# Patient Record
Sex: FEMALE | Race: WHITE | NOT HISPANIC OR LATINO
[De-identification: names, ages, dates, MRNs, and addresses within clinical notes are randomized per-mention and may not be internally consistent; named-entity substitution may affect disease eponyms.]

---
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
## 2021-04-03 ENCOUNTER — TRANSCRIPTION ENCOUNTER (OUTPATIENT)
Age: 21
End: 2021-04-03

## 2023-08-09 PROBLEM — Z00.00 ENCOUNTER FOR PREVENTIVE HEALTH EXAMINATION: Status: ACTIVE | Noted: 2023-08-09

## 2023-08-30 ENCOUNTER — NON-APPOINTMENT (OUTPATIENT)
Age: 23
End: 2023-08-30

## 2023-08-30 ENCOUNTER — APPOINTMENT (OUTPATIENT)
Dept: ENDOCRINOLOGY | Facility: CLINIC | Age: 23
End: 2023-08-30
Payer: COMMERCIAL

## 2023-08-30 VITALS
HEART RATE: 85 BPM | WEIGHT: 130 LBS | OXYGEN SATURATION: 98 % | BODY MASS INDEX: 24.55 KG/M2 | SYSTOLIC BLOOD PRESSURE: 120 MMHG | DIASTOLIC BLOOD PRESSURE: 60 MMHG | RESPIRATION RATE: 17 BRPM | HEIGHT: 61 IN | TEMPERATURE: 98.4 F

## 2023-08-30 DIAGNOSIS — Z78.9 OTHER SPECIFIED HEALTH STATUS: ICD-10-CM

## 2023-08-30 PROCEDURE — 99205 OFFICE O/P NEW HI 60 MIN: CPT

## 2023-08-30 NOTE — PHYSICAL EXAM
[de-identified] : General: No distress, well nourished Eyes: Normal Sclera, EOMI, PERRL ENT: Normal appearance of the nose, normal oropharynx Neck/Thyroid: No cervical lymphadenopathy, thyroid gland 60 g in size, no thyroid nodules, non-tender Respiratory: No use of accessory muscles of respiration, vesicular breath sounds heard bilaterally, no crepitations or ronchi Cardiovascular: S1 and S2 heard and normal, no S3 or S4, no murmurs, radial pulse normal bilaterally Abdomen: soft, non-tender, no masses, normal bowel sounds Musculoskeletal: No swelling or deformities of joints of hands, no pedal edema Neurological: Normal range of motion in the hands, Normal brachioradialis reflexes bilaterally Psychiatry: Patient converses normally, good judgement and insight Skin: No rashes in hands, no nodules palpated in hands

## 2023-08-30 NOTE — HISTORY OF PRESENT ILLNESS
[FreeTextEntry1] : Problems: 1. Hyperthyroidism due to Graves' disease 2. Thyroid goiter 3. Graves' orbitopathy 4. Thyroid goiter  Hyperthyroidism due to Graves' disease/Thyroid goiter/Graves' orbitopathy/Thyroid goiter 1. Diagnosed in April 2023 with severe hyperthyroidism due to Graves' disease 2. Labs: April 2023 - TRAB 21.96 (</+2),  (<140), TPO antibodies 9 (<9), antithyroglobulin antibodies 11 (</=1), TSH < 0.015 (0.47 to 4.7), Free T4 - 5.64 (0.71 to 1.85), Free T3 > 22.8 (2.77 to 5.27) June 2023 - 06/06/2023 - Cr N, AST 37 (14 to 36), ALT 38 (<35), WBC N, Hb N, plt N 07/25/2023 - WBC N, Hb N, plt 288 N, Cr N, AST N, ALT N 07/31/2023 - TSH < 0.015 (low), Free T3 - 17.9 (2.77 to 5.27), Free T4 - 1.71 N 3. Radiology: 02/16/2023 - US thyroid - thyroid gland normal in size, no nodules seen 4. Father and sister have hypothyroidism, no family history of thyroid cancer, no personal history of radiation treatment 5. Has Graves' orbitopathy and follows up with Ophthalmology, no Graves' dermopathy 6. Meds: Patient is on oral hormonal contraceptive pills Patient used methimazole in the past - had puritis and vomiting with methimazole 10 mg po bid - this was discontinued and reintroduced at a lower dose but vomiting recurred.  PTU 50 mg po four times per day - patient started this dose in June 2023.  Patient was on propranolol but she stopped using this as she says she felt like she no longer needed it.

## 2023-08-30 NOTE — ASSESSMENT
[FreeTextEntry1] : This patient was diagnosed with severe hyperthyroidism due to Graves' disease in April 2023 (TSH-R abs were positive). She also has Graves' orbitopathy and follows up with Ophthalmology.  The patient is on PTU as she had pruritis and vomiting even with small doses of methimazole.  Labs from 07/31/2023 revealed she was still hyperthyroid on PTU 50 mg po four times daily so I increased the dose to  mg po tid on 08/30/2023.   Patient used propranolol in the past but she no longer wants to use this.  The patient wishes to avoid radioactive iodine therapy or surgery if she can - in addition, radioactive iodine therapy can worsen her Graves' orbitopathy.   She has a thyroid goiter that was not present in US from Feb 2023 so I ordered a repeat thyroid US.  Patient advised that PTU may cause agranulocytosis and hepatic injury - the patient is to stop PTU and contact me urgently or go to an Urgent care facility if the patient develops fever, sore throat or jaundice.    Plan: 1. Increase PTU to 100 mg po tid 2. Thyroid US  3. Labs to be done in 2 weeks - see below 4. Follow up in 2 weeks to review results - telehealth visit ok

## 2023-09-06 ENCOUNTER — APPOINTMENT (OUTPATIENT)
Dept: ULTRASOUND IMAGING | Facility: IMAGING CENTER | Age: 23
End: 2023-09-06

## 2023-09-11 ENCOUNTER — OUTPATIENT (OUTPATIENT)
Dept: OUTPATIENT SERVICES | Facility: HOSPITAL | Age: 23
LOS: 1 days | End: 2023-09-11
Payer: COMMERCIAL

## 2023-09-11 ENCOUNTER — APPOINTMENT (OUTPATIENT)
Dept: ULTRASOUND IMAGING | Facility: CLINIC | Age: 23
End: 2023-09-11
Payer: COMMERCIAL

## 2023-09-11 DIAGNOSIS — E05.00 THYROTOXICOSIS WITH DIFFUSE GOITER WITHOUT THYROTOXIC CRISIS OR STORM: ICD-10-CM

## 2023-09-11 PROCEDURE — 76536 US EXAM OF HEAD AND NECK: CPT

## 2023-09-11 PROCEDURE — 76536 US EXAM OF HEAD AND NECK: CPT | Mod: 26

## 2023-09-13 LAB
HCT VFR BLD CALC: 41.7 %
HGB BLD-MCNC: 14.8 G/DL
MCHC RBC-ENTMCNC: 29.5 PG
MCHC RBC-ENTMCNC: 35.5 GM/DL
MCV RBC AUTO: 83.1 FL
PLATELET # BLD AUTO: 260 K/UL
RBC # BLD: 5.02 M/UL
RBC # FLD: 13.1 %
WBC # FLD AUTO: 6.4 K/UL

## 2023-09-14 ENCOUNTER — APPOINTMENT (OUTPATIENT)
Dept: ENDOCRINOLOGY | Facility: CLINIC | Age: 23
End: 2023-09-14
Payer: COMMERCIAL

## 2023-09-14 LAB
ALBUMIN SERPL ELPH-MCNC: 4.2 G/DL
ALP BLD-CCNC: 138 U/L
ALT SERPL-CCNC: 20 U/L
ANION GAP SERPL CALC-SCNC: 13 MMOL/L
AST SERPL-CCNC: 19 U/L
BILIRUB SERPL-MCNC: 0.5 MG/DL
BUN SERPL-MCNC: 13 MG/DL
CALCIUM SERPL-MCNC: 9 MG/DL
CHLORIDE SERPL-SCNC: 105 MMOL/L
CO2 SERPL-SCNC: 21 MMOL/L
CREAT SERPL-MCNC: 0.82 MG/DL
EGFR: 103 ML/MIN/1.73M2
GLUCOSE SERPL-MCNC: 89 MG/DL
POTASSIUM SERPL-SCNC: 4.3 MMOL/L
PROT SERPL-MCNC: 6.1 G/DL
SODIUM SERPL-SCNC: 139 MMOL/L
T3FREE SERPL-MCNC: 3.45 PG/ML
T4 FREE SERPL-MCNC: 0.2 NG/DL
TSH SERPL-ACNC: <0.01 UIU/ML

## 2023-09-14 PROCEDURE — 99215 OFFICE O/P EST HI 40 MIN: CPT | Mod: 95

## 2023-09-14 RX ORDER — PROPYLTHIOURACIL 50 MG/1
50 TABLET ORAL
Refills: 0 | Status: COMPLETED | COMMUNITY

## 2023-09-19 ENCOUNTER — APPOINTMENT (OUTPATIENT)
Dept: OPHTHALMOLOGY | Facility: CLINIC | Age: 23
End: 2023-09-19
Payer: COMMERCIAL

## 2023-09-19 ENCOUNTER — NON-APPOINTMENT (OUTPATIENT)
Age: 23
End: 2023-09-19

## 2023-09-19 ENCOUNTER — TRANSCRIPTION ENCOUNTER (OUTPATIENT)
Age: 23
End: 2023-09-19

## 2023-09-19 PROCEDURE — 92004 COMPRE OPH EXAM NEW PT 1/>: CPT

## 2023-10-18 ENCOUNTER — APPOINTMENT (OUTPATIENT)
Dept: ENDOCRINOLOGY | Facility: CLINIC | Age: 23
End: 2023-10-18

## 2023-10-26 LAB
ALBUMIN SERPL ELPH-MCNC: 4.2 G/DL
ALP BLD-CCNC: 128 U/L
ALT SERPL-CCNC: 17 U/L
ANION GAP SERPL CALC-SCNC: 11 MMOL/L
AST SERPL-CCNC: 17 U/L
BASOPHILS # BLD AUTO: 0.03 K/UL
BASOPHILS NFR BLD AUTO: 0.4 %
BILIRUB SERPL-MCNC: 0.7 MG/DL
BUN SERPL-MCNC: 14 MG/DL
CALCIUM SERPL-MCNC: 9.7 MG/DL
CHLORIDE SERPL-SCNC: 106 MMOL/L
CO2 SERPL-SCNC: 22 MMOL/L
CREAT SERPL-MCNC: 0.71 MG/DL
EGFR: 122 ML/MIN/1.73M2
EOSINOPHIL # BLD AUTO: 0.02 K/UL
EOSINOPHIL NFR BLD AUTO: 0.3 %
GLUCOSE SERPL-MCNC: 97 MG/DL
HCT VFR BLD CALC: 41.7 %
HGB BLD-MCNC: 15 G/DL
IMM GRANULOCYTES NFR BLD AUTO: 0 %
LYMPHOCYTES # BLD AUTO: 2.09 K/UL
LYMPHOCYTES NFR BLD AUTO: 30.1 %
MAN DIFF?: NORMAL
MCHC RBC-ENTMCNC: 29.9 PG
MCHC RBC-ENTMCNC: 36 GM/DL
MCV RBC AUTO: 83.1 FL
MONOCYTES # BLD AUTO: 0.9 K/UL
MONOCYTES NFR BLD AUTO: 13 %
NEUTROPHILS # BLD AUTO: 3.9 K/UL
NEUTROPHILS NFR BLD AUTO: 56.2 %
PLATELET # BLD AUTO: 236 K/UL
POTASSIUM SERPL-SCNC: 4.5 MMOL/L
PROT SERPL-MCNC: 6.4 G/DL
RBC # BLD: 5.02 M/UL
RBC # FLD: 13 %
SODIUM SERPL-SCNC: 139 MMOL/L
T3FREE SERPL-MCNC: 12.6 PG/ML
T4 FREE SERPL-MCNC: 1.3 NG/DL
TSH SERPL-ACNC: <0.01 UIU/ML
WBC # FLD AUTO: 6.94 K/UL

## 2023-11-03 ENCOUNTER — APPOINTMENT (OUTPATIENT)
Dept: ENDOCRINOLOGY | Facility: CLINIC | Age: 23
End: 2023-11-03
Payer: COMMERCIAL

## 2023-11-03 PROCEDURE — 99214 OFFICE O/P EST MOD 30 MIN: CPT | Mod: 95

## 2023-11-20 ENCOUNTER — NON-APPOINTMENT (OUTPATIENT)
Age: 23
End: 2023-11-20

## 2023-11-20 ENCOUNTER — APPOINTMENT (OUTPATIENT)
Dept: OPHTHALMOLOGY | Facility: CLINIC | Age: 23
End: 2023-11-20
Payer: COMMERCIAL

## 2023-11-20 PROCEDURE — 92285 EXTERNAL OCULAR PHOTOGRAPHY: CPT

## 2023-11-20 PROCEDURE — 99215 OFFICE O/P EST HI 40 MIN: CPT

## 2023-11-27 ENCOUNTER — APPOINTMENT (OUTPATIENT)
Dept: DERMATOLOGY | Facility: CLINIC | Age: 23
End: 2023-11-27
Payer: COMMERCIAL

## 2023-11-27 DIAGNOSIS — L72.0 EPIDERMAL CYST: ICD-10-CM

## 2023-11-27 DIAGNOSIS — L60.0 INGROWING NAIL: ICD-10-CM

## 2023-11-27 PROCEDURE — 99204 OFFICE O/P NEW MOD 45 MIN: CPT

## 2023-11-27 RX ORDER — TRIAMCINOLONE ACETONIDE 1 MG/G
0.1 OINTMENT TOPICAL
Qty: 1 | Refills: 1 | Status: ACTIVE | COMMUNITY
Start: 2023-11-27 | End: 1900-01-01

## 2023-11-30 ENCOUNTER — APPOINTMENT (OUTPATIENT)
Dept: CT IMAGING | Facility: HOSPITAL | Age: 23
End: 2023-11-30
Payer: COMMERCIAL

## 2023-11-30 ENCOUNTER — OUTPATIENT (OUTPATIENT)
Dept: OUTPATIENT SERVICES | Facility: HOSPITAL | Age: 23
LOS: 1 days | End: 2023-11-30
Payer: COMMERCIAL

## 2023-11-30 DIAGNOSIS — E05.00 THYROTOXICOSIS WITH DIFFUSE GOITER WITHOUT THYROTOXIC CRISIS OR STORM: ICD-10-CM

## 2023-11-30 PROCEDURE — 70481 CT ORBIT/EAR/FOSSA W/DYE: CPT | Mod: 26

## 2023-11-30 PROCEDURE — 70481 CT ORBIT/EAR/FOSSA W/DYE: CPT

## 2023-12-01 ENCOUNTER — APPOINTMENT (OUTPATIENT)
Dept: OPHTHALMOLOGY | Facility: CLINIC | Age: 23
End: 2023-12-01
Payer: COMMERCIAL

## 2023-12-01 ENCOUNTER — NON-APPOINTMENT (OUTPATIENT)
Age: 23
End: 2023-12-01

## 2023-12-01 PROCEDURE — 99214 OFFICE O/P EST MOD 30 MIN: CPT

## 2023-12-22 ENCOUNTER — NON-APPOINTMENT (OUTPATIENT)
Age: 23
End: 2023-12-22

## 2023-12-22 ENCOUNTER — APPOINTMENT (OUTPATIENT)
Dept: OPHTHALMOLOGY | Facility: CLINIC | Age: 23
End: 2023-12-22

## 2023-12-22 ENCOUNTER — APPOINTMENT (OUTPATIENT)
Dept: OPHTHALMOLOGY | Facility: CLINIC | Age: 23
End: 2023-12-22
Payer: COMMERCIAL

## 2023-12-22 PROCEDURE — 99214 OFFICE O/P EST MOD 30 MIN: CPT

## 2023-12-22 PROCEDURE — 92285 EXTERNAL OCULAR PHOTOGRAPHY: CPT

## 2024-01-08 LAB
ALBUMIN SERPL ELPH-MCNC: 4.4 G/DL
ALP BLD-CCNC: 97 U/L
ALT SERPL-CCNC: 20 U/L
ANION GAP SERPL CALC-SCNC: 11 MMOL/L
AST SERPL-CCNC: 15 U/L
BASOPHILS # BLD AUTO: 0.04 K/UL
BASOPHILS NFR BLD AUTO: 0.4 %
BILIRUB SERPL-MCNC: 0.3 MG/DL
BUN SERPL-MCNC: 15 MG/DL
CALCIUM SERPL-MCNC: 9.6 MG/DL
CHLORIDE SERPL-SCNC: 104 MMOL/L
CO2 SERPL-SCNC: 24 MMOL/L
CREAT SERPL-MCNC: 0.82 MG/DL
EGFR: 102 ML/MIN/1.73M2
EOSINOPHIL # BLD AUTO: 0.02 K/UL
EOSINOPHIL NFR BLD AUTO: 0.2 %
GLUCOSE SERPL-MCNC: 89 MG/DL
HCT VFR BLD CALC: 43.3 %
HGB BLD-MCNC: 15.6 G/DL
IMM GRANULOCYTES NFR BLD AUTO: 0.5 %
LYMPHOCYTES # BLD AUTO: 3.07 K/UL
LYMPHOCYTES NFR BLD AUTO: 27.4 %
MAN DIFF?: NORMAL
MCHC RBC-ENTMCNC: 30.5 PG
MCHC RBC-ENTMCNC: 36 GM/DL
MCV RBC AUTO: 84.7 FL
MONOCYTES # BLD AUTO: 0.76 K/UL
MONOCYTES NFR BLD AUTO: 6.8 %
NEUTROPHILS # BLD AUTO: 7.26 K/UL
NEUTROPHILS NFR BLD AUTO: 64.7 %
PLATELET # BLD AUTO: 326 K/UL
POTASSIUM SERPL-SCNC: 4.4 MMOL/L
PROT SERPL-MCNC: 6.7 G/DL
RBC # BLD: 5.11 M/UL
RBC # FLD: 13.1 %
SODIUM SERPL-SCNC: 140 MMOL/L
T3FREE SERPL-MCNC: 2.65 PG/ML
T4 FREE SERPL-MCNC: 0.9 NG/DL
TSH SERPL-ACNC: 0.08 UIU/ML
WBC # FLD AUTO: 11.21 K/UL

## 2024-01-09 ENCOUNTER — APPOINTMENT (OUTPATIENT)
Dept: ENDOCRINOLOGY | Facility: CLINIC | Age: 24
End: 2024-01-09
Payer: COMMERCIAL

## 2024-01-09 ENCOUNTER — NON-APPOINTMENT (OUTPATIENT)
Age: 24
End: 2024-01-09

## 2024-01-09 ENCOUNTER — APPOINTMENT (OUTPATIENT)
Dept: OPHTHALMOLOGY | Facility: CLINIC | Age: 24
End: 2024-01-09
Payer: COMMERCIAL

## 2024-01-09 VITALS
BODY MASS INDEX: 25.49 KG/M2 | TEMPERATURE: 98.4 F | DIASTOLIC BLOOD PRESSURE: 80 MMHG | HEIGHT: 61 IN | RESPIRATION RATE: 17 BRPM | SYSTOLIC BLOOD PRESSURE: 122 MMHG | WEIGHT: 135 LBS | OXYGEN SATURATION: 95 % | HEART RATE: 72 BPM

## 2024-01-09 PROCEDURE — 99215 OFFICE O/P EST HI 40 MIN: CPT

## 2024-01-09 PROCEDURE — 99214 OFFICE O/P EST MOD 30 MIN: CPT

## 2024-01-09 PROCEDURE — 92285 EXTERNAL OCULAR PHOTOGRAPHY: CPT

## 2024-01-09 NOTE — PHYSICAL EXAM
[de-identified] : General: No distress, well nourished Eyes: Normal Sclera, EOMI, PERRL ENT: Normal appearance of the nose, normal oropharynx Neck/Thyroid: No cervical lymphadenopathy, thyroid gland 20 g in size, no thyroid nodules, non-tender Respiratory: No use of accessory muscles of respiration, vesicular breath sounds heard bilaterally, no crepitations or ronchi Cardiovascular: S1 and S2 heard and normal, no S3 or S4, no murmurs, radial pulse normal bilaterally Abdomen: soft, non-tender, no masses, normal bowel sounds Musculoskeletal: No swelling or deformities of joints of hands, no pedal edema Neurological: Normal range of motion in the hands, Normal brachioradialis reflexes bilaterally Psychiatry: Patient converses normally, good judgement and insight Skin: No rashes in hands, no nodules palpated in hands

## 2024-01-09 NOTE — HISTORY OF PRESENT ILLNESS
[FreeTextEntry1] : Problems: 1. Hyperthyroidism due to Graves' disease 2. Thyroid goiter 3. Graves' orbitopathy  Hyperthyroidism due to Graves' disease/Thyroid goiter/Graves' orbitopathy/Thyroid goiter 1. Diagnosed in April 2023 with severe hyperthyroidism due to Graves' disease 2. Labs: April 2023 - TRAB 21.96 (</+2),  (<140), TPO antibodies 9 (<9), antithyroglobulin antibodies 11 (</=1), TSH < 0.015 (0.47 to 4.7), Free T4 - 5.64 (0.71 to 1.85), Free T3 > 22.8 (2.77 to 5.27) June 2023 - 06/06/2023 - Cr N, AST 37 (14 to 36), ALT 38 (<35), WBC N, Hb N, plt N 07/25/2023 - WBC N, Hb N, plt 288 N, Cr N, AST N, ALT N 07/31/2023 - TSH < 0.015 (low), Free T3 - 17.9 (2.77 to 5.27), Free T4 - 1.71 N 11/30/2023 - TSI 24.9 (0-0.55), TSH-R abs 33.7 (0-1.75) 3. Radiology: A. 02/16/2023 - US thyroid - thyroid gland normal in size, no nodules seen B. 09/11/2023 - US thyroid - Enlarged heterogeneous hyperemic thyroid gland without focal nodularity consistent with a goiter. Right Lobe: 6.8 cm x 2.7 cm x 2.8 cm. Left Lobe: 6.8 cm x 3.1 cm x 3.4 cm. 4. Father and sister have hypothyroidism, no family history of thyroid cancer, no personal history of radiation treatment 5. Has Graves' orbitopathy and follows up with Ophthalmology - PATIENT IS ON PREDNISONE TO TREAT THIS, no Graves' dermopathy 6. Meds: A. Patient is on oral hormonal contraceptive pills B. Patient used methimazole in the past - had puritis and vomiting with methimazole 10 mg po bid - this was discontinued and reintroduced at a lower dose but vomiting recurred.  C. Patient was then switched to PTU - she started PTU 50 mg po four times per day in June 2023 - labs indicate she was hyperthyroid on this dose but patient stated she missed her PTU for one week prior to labs being done.  D. Patient was on propranolol in past but she stopped using this as she says she felt like she no longer needed it.  E. Current treatment: 08/30/2023 to 09/14/2023 - patient used  mg po tid (dispensed as 50 mg pills) but was hypothyroid on this on 09/13/2023 (Free T4 was low at 0.2) so the dose was changed on 09/14/2023 09/14/2023 to 11/03/2023 - patient used  mg po bid (dispensed as 50 mg pills) but had overt hyperthyroidism on 10/25/2023 so the dose was changed on 11/03/2023 On 01/08/2024, TSH was suppressed and Free T4 and Total T3 were normal on  mg po am, 50 mg po lunch time and 100 mg po pm (dispensed as 50 mg pills) - patient started this dose on 11/03/2023

## 2024-01-09 NOTE — ASSESSMENT
[FreeTextEntry1] : This patient was diagnosed with severe hyperthyroidism due to Graves' disease in April 2023 (TSH-R abs were positive). She also has Graves' orbitopathy and follows up with Ophthalmology.  The patient is on PTU as she had pruritis and vomiting even with small doses of methimazole.  On 01/08/2024, TSH was suppressed and Free T4 and Free T3 were normal on  mg po am, 50 mg po lunch time and 100 mg po pm (dispensed as 50 mg pills) - patient started this dose on 11/03/2023. Normalization of the TSH lags behind normalization of the Free T4 and Free T3 so I did not adjust her dose of PTU.   Patient used propranolol in the past but she no longer wants to use this.  The patient wishes to avoid radioactive iodine therapy or surgery if she can - in addition, radioactive iodine therapy can worsen her Graves' orbitopathy. THE PATIENT IS ON PREDNISONE TO TREAT HER GRAVES' ORBITOPATHY  She has a thyroid goiter - thyroid US in Sept 2023 revealed no thyroid nodules.  Patient advised that PTU may cause agranulocytosis and hepatic injury - the patient is to stop PTU and contact me urgently or go to an Urgent care facility if the patient develops fever, sore throat or jaundice.  Plan: 1. Continue PTU to 100 mg po am, 50 mg po lunch time and 100 mg po pm (dispensed as 50 mg pills) 2. Labs to be done in 3 months - see below- patient is concerned about her Grave's orbitopathy worsening so I will also monitor TSI and TSH-R antibodies - her ophthalmologist also wishes that this be monitored.  3. Follow up in 3 months to review results - telehealth visit ok.

## 2024-01-10 ENCOUNTER — OUTPATIENT (OUTPATIENT)
Dept: OUTPATIENT SERVICES | Facility: HOSPITAL | Age: 24
LOS: 1 days | Discharge: HOME | End: 2024-01-10

## 2024-01-10 ENCOUNTER — NON-APPOINTMENT (OUTPATIENT)
Age: 24
End: 2024-01-10

## 2024-01-10 ENCOUNTER — APPOINTMENT (OUTPATIENT)
Dept: SPEECH THERAPY | Facility: CLINIC | Age: 24
End: 2024-01-10

## 2024-01-10 LAB — TSI ACT/NOR SER: 9.3 IU/L

## 2024-01-10 NOTE — PLAN
[FreeTextEntry2] : 1. Audiological monitoring as per MD or sooner if change in hearing is suspected.

## 2024-01-10 NOTE — PROCEDURE
[Normal Cochlear] : consistent with normal cochlear outer hair cell function  [226 Hz] : 226 Hz [Normal Eardrum Mobility] : consistent with normal eardrum mobility [Type A Tympanogram] : Type A Normal [] : Audiogram: [] : Complete Audiological Evaluation [Good] : good [Insert Ear Phones] : insert ear phones [FreeTextEntry2] : TEOAEs present 1.5-4 kHz, bilaterally.  [de-identified] : Hearing within normal limits 250-8000 Hz, bilaterally. Thresholds obtained at 0 dB HL 10,000-12,500 Hz, bilaterally. Excellent SRS scores, bilaterally.

## 2024-01-10 NOTE — HISTORY OF PRESENT ILLNESS
[FreeTextEntry1] : 23-year-old female referred for baseline audiological evaluation prior to beginning treatment with Tepezza for Graves' disease. Patient reports no difficulties hearing. Very infrequent occasions of high-pitched tinnitus noted. Patient denies otalgia, aural fullness, and family history of hearing loss.

## 2024-01-10 NOTE — ASSESSMENT
[FreeTextEntry1] : Reviewed results and recommendations with patient. Patient expressed understanding of all. Discussed signs of change in hearing. Patient indicated that she understood all.

## 2024-01-11 LAB — TSH RECEPTOR AB: 1.88 IU/L

## 2024-01-16 ENCOUNTER — APPOINTMENT (OUTPATIENT)
Dept: OPHTHALMOLOGY | Facility: CLINIC | Age: 24
End: 2024-01-16

## 2024-01-17 DIAGNOSIS — H93.299 OTHER ABNORMAL AUDITORY PERCEPTIONS, UNSPECIFIED EAR: ICD-10-CM

## 2024-01-17 DIAGNOSIS — H93.293 OTHER ABNORMAL AUDITORY PERCEPTIONS, BILATERAL: ICD-10-CM

## 2024-01-30 ENCOUNTER — NON-APPOINTMENT (OUTPATIENT)
Age: 24
End: 2024-01-30

## 2024-01-30 ENCOUNTER — APPOINTMENT (OUTPATIENT)
Dept: OPHTHALMOLOGY | Facility: CLINIC | Age: 24
End: 2024-01-30
Payer: COMMERCIAL

## 2024-01-30 PROCEDURE — 99214 OFFICE O/P EST MOD 30 MIN: CPT

## 2024-01-30 PROCEDURE — 92083 EXTENDED VISUAL FIELD XM: CPT

## 2024-01-30 PROCEDURE — 92285 EXTERNAL OCULAR PHOTOGRAPHY: CPT

## 2024-01-30 PROCEDURE — 92133 CPTRZD OPH DX IMG PST SGM ON: CPT

## 2024-02-07 ENCOUNTER — APPOINTMENT (OUTPATIENT)
Dept: ENDOCRINOLOGY | Facility: CLINIC | Age: 24
End: 2024-02-07

## 2024-02-27 ENCOUNTER — APPOINTMENT (OUTPATIENT)
Dept: OPHTHALMOLOGY | Facility: CLINIC | Age: 24
End: 2024-02-27
Payer: COMMERCIAL

## 2024-02-27 ENCOUNTER — NON-APPOINTMENT (OUTPATIENT)
Age: 24
End: 2024-02-27

## 2024-02-27 PROCEDURE — 92285 EXTERNAL OCULAR PHOTOGRAPHY: CPT

## 2024-02-27 PROCEDURE — 99213 OFFICE O/P EST LOW 20 MIN: CPT

## 2024-03-13 ENCOUNTER — APPOINTMENT (OUTPATIENT)
Dept: RHEUMATOLOGY | Facility: CLINIC | Age: 24
End: 2024-03-13
Payer: COMMERCIAL

## 2024-03-13 VITALS — HEART RATE: 60 BPM | SYSTOLIC BLOOD PRESSURE: 119 MMHG | OXYGEN SATURATION: 99 % | DIASTOLIC BLOOD PRESSURE: 73 MMHG

## 2024-03-13 VITALS
DIASTOLIC BLOOD PRESSURE: 80 MMHG | RESPIRATION RATE: 16 BRPM | HEART RATE: 66 BPM | SYSTOLIC BLOOD PRESSURE: 131 MMHG | TEMPERATURE: 98.1 F | OXYGEN SATURATION: 97 %

## 2024-03-13 PROCEDURE — 96413 CHEMO IV INFUSION 1 HR: CPT

## 2024-03-14 RX ADMIN — TEPROTUMUMAB 0 MG: 500 INJECTION, POWDER, LYOPHILIZED, FOR SOLUTION INTRAVENOUS at 00:00

## 2024-03-15 RX ORDER — TEPROTUMUMAB 500 MG/1
500 INJECTION, POWDER, LYOPHILIZED, FOR SOLUTION INTRAVENOUS
Qty: 0 | Refills: 0 | Status: COMPLETED | OUTPATIENT
Start: 2024-03-14

## 2024-03-19 NOTE — HISTORY OF PRESENT ILLNESS
[No] : No [Denies] : Denies [N/A] : N/A [Declined] : Declined [Informed consent documented in EHR.] : Informed consent documented in EHR. [Right upper extremity] : Right upper extremity [24g] : 24g [Start Time: ___] : Medication Start Time: [unfilled] [End Time: ___] : Medication End Time: [unfilled] [Medication Name: ___] : Medication Name: [unfilled] [IV discontinued. Intact. No signs or symptoms of IV complications noted. Time: ___] : IV discontinued. Intact. No signs or symptoms of IV complications noted. Time: [unfilled] [Total Amount Administered: ___] : Total Amount Administered: [unfilled] [Patient left unit in no acute distress] : Patient left unit in no acute distress [Patient  instructed to seek medical attention with signs and symptoms of adverse effects] : Patient  instructed to seek medical attention with signs and symptoms of adverse effects [Medications administered as ordered and tolerated well.] : Medications administered as ordered and tolerated well. [de-identified] : first dose [Drug wasted: _____] : Drug wasted: [unfilled] [de-identified] : Hearring test performed 1/10/2024 [de-identified] : Patient presents for scheduled Tepezza infusion. Patient with history of thyroid eye disease, present with exophthalmos and complaints to dryness to right eye. Patient otherwise denies any other s/s. Medication education reinforced with patient, patient agreeable to all discussed.

## 2024-03-29 LAB
ALBUMIN SERPL ELPH-MCNC: 4.7 G/DL
ALP BLD-CCNC: 63 U/L
ALT SERPL-CCNC: 21 U/L
ANION GAP SERPL CALC-SCNC: 13 MMOL/L
AST SERPL-CCNC: 22 U/L
BASOPHILS # BLD AUTO: 0.02 K/UL
BASOPHILS NFR BLD AUTO: 0.3 %
BILIRUB SERPL-MCNC: 0.4 MG/DL
BUN SERPL-MCNC: 15 MG/DL
CALCIUM SERPL-MCNC: 9.7 MG/DL
CHLORIDE SERPL-SCNC: 102 MMOL/L
CO2 SERPL-SCNC: 24 MMOL/L
CREAT SERPL-MCNC: 1.03 MG/DL
EGFR: 78 ML/MIN/1.73M2
EOSINOPHIL # BLD AUTO: 0.01 K/UL
EOSINOPHIL NFR BLD AUTO: 0.2 %
GLUCOSE SERPL-MCNC: 102 MG/DL
HCT VFR BLD CALC: 39.6 %
HGB BLD-MCNC: 14.3 G/DL
IMM GRANULOCYTES NFR BLD AUTO: 0.2 %
LYMPHOCYTES # BLD AUTO: 2.24 K/UL
LYMPHOCYTES NFR BLD AUTO: 36.5 %
MAN DIFF?: NORMAL
MCHC RBC-ENTMCNC: 32 PG
MCHC RBC-ENTMCNC: 36.1 GM/DL
MCV RBC AUTO: 88.6 FL
MONOCYTES # BLD AUTO: 0.53 K/UL
MONOCYTES NFR BLD AUTO: 8.6 %
NEUTROPHILS # BLD AUTO: 3.33 K/UL
NEUTROPHILS NFR BLD AUTO: 54.2 %
PLATELET # BLD AUTO: 264 K/UL
POTASSIUM SERPL-SCNC: 4.1 MMOL/L
PROT SERPL-MCNC: 7.1 G/DL
RBC # BLD: 4.47 M/UL
RBC # FLD: 12.2 %
SODIUM SERPL-SCNC: 138 MMOL/L
T3FREE SERPL-MCNC: 2.76 PG/ML
T4 FREE SERPL-MCNC: 1.1 NG/DL
TSH SERPL-ACNC: 1.41 UIU/ML
WBC # FLD AUTO: 6.14 K/UL

## 2024-03-30 LAB — TSH RECEPTOR AB: 3.11 IU/L

## 2024-04-01 ENCOUNTER — APPOINTMENT (OUTPATIENT)
Dept: RHEUMATOLOGY | Facility: CLINIC | Age: 24
End: 2024-04-01

## 2024-04-01 VITALS
SYSTOLIC BLOOD PRESSURE: 122 MMHG | RESPIRATION RATE: 16 BRPM | DIASTOLIC BLOOD PRESSURE: 84 MMHG | TEMPERATURE: 97.8 F | HEART RATE: 67 BPM | OXYGEN SATURATION: 98 %

## 2024-04-01 LAB — TSI ACT/NOR SER: 1.91 IU/L

## 2024-04-01 RX ORDER — TEPROTUMUMAB 500 MG/1
500 INJECTION, POWDER, LYOPHILIZED, FOR SOLUTION INTRAVENOUS
Qty: 630 | Refills: 0 | Status: DISCONTINUED | OUTPATIENT
Start: 2024-03-11 | End: 2024-04-01

## 2024-04-02 ENCOUNTER — APPOINTMENT (OUTPATIENT)
Dept: SPEECH THERAPY | Facility: CLINIC | Age: 24
End: 2024-04-02

## 2024-04-04 ENCOUNTER — APPOINTMENT (OUTPATIENT)
Dept: RHEUMATOLOGY | Facility: CLINIC | Age: 24
End: 2024-04-04
Payer: COMMERCIAL

## 2024-04-04 VITALS
DIASTOLIC BLOOD PRESSURE: 82 MMHG | HEART RATE: 70 BPM | SYSTOLIC BLOOD PRESSURE: 133 MMHG | OXYGEN SATURATION: 97 % | RESPIRATION RATE: 16 BRPM | TEMPERATURE: 98 F

## 2024-04-04 VITALS
OXYGEN SATURATION: 98 % | HEART RATE: 68 BPM | DIASTOLIC BLOOD PRESSURE: 80 MMHG | SYSTOLIC BLOOD PRESSURE: 130 MMHG | RESPIRATION RATE: 16 BRPM

## 2024-04-04 PROCEDURE — 96413 CHEMO IV INFUSION 1 HR: CPT

## 2024-04-04 RX ORDER — TEPROTUMUMAB 500 MG/1
500 INJECTION, POWDER, LYOPHILIZED, FOR SOLUTION INTRAVENOUS
Refills: 0 | Status: ACTIVE | OUTPATIENT
Start: 2024-03-26 | End: 1900-01-01

## 2024-04-04 RX ORDER — TEPROTUMUMAB 500 MG/1
500 INJECTION, POWDER, LYOPHILIZED, FOR SOLUTION INTRAVENOUS
Qty: 0 | Refills: 0 | Status: COMPLETED
Start: 2024-03-26

## 2024-04-04 NOTE — HISTORY OF PRESENT ILLNESS
[Denies] : Denies [No] : No [Yes] : Yes [Informed consent documented in EHR.] : Informed consent documented in EHR. [de-identified] : dose 2:8 [24g] : 24g [Start Time: ___] : Medication Start Time: [unfilled] [End Time: ___] : Medication End Time: [unfilled] [Medication Name: ___] : Medication Name: [unfilled] [Total Amount Administered: ___] : Total Amount Administered: [unfilled] [IV discontinued. Intact. No signs or symptoms of IV complications noted. Time: ___] : IV discontinued. Intact. No signs or symptoms of IV complications noted. Time: [unfilled] [Patient  instructed to seek medical attention with signs and symptoms of adverse effects] : Patient  instructed to seek medical attention with signs and symptoms of adverse effects [Patient left unit in no acute distress] : Patient left unit in no acute distress [Medications administered as ordered and tolerated well.] : Medications administered as ordered and tolerated well. [Drug wasted: _____] : Drug wasted: [unfilled] [de-identified] : right arm median cubital vein  [de-identified] : Hearring test performed 4/2/2024 [de-identified] : Patient presents for scheduled Tepezza infusion dose 2 :8, ambulatory in [facility]. Patient states that week after her first dose she was having left ear "popping' sensation/ s/p hearing test on Apr 2nd , without abnormal findings and hailee to cont. with treatment. Patient with exophthalmos and complaints to dryness to right eye. Patient otherwise denies any other s/s. Medication administered as prescribed and infusion tolerated well. Discharged instructions provided and patient left unit ambulatory in [facility].

## 2024-04-05 NOTE — ASSESSMENT
[FreeTextEntry1] : Reviewed results and recommendations with patient. Patient expressed understanding of all  Hearing stable since prior evaluation. . 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 Advised can see ENT for tinnitus.  Patient indicated that she understood all.

## 2024-04-05 NOTE — PROCEDURE
[Normal Cochlear] : consistent with normal cochlear outer hair cell function  [Normal Eardrum Mobility] : consistent with normal eardrum mobility [226 Hz] : 226 Hz [Type A Tympanogram] : Type A Normal [] : Audiogram: [] : Complete Audiological Evaluation [Good] : good [Insert Ear Phones] : insert ear phones [FreeTextEntry2] : TEOAEs present 1.5-4 kHz, bilaterally.  [de-identified] : Hearing within normal limits 250-8000 Hz, bilaterally. Thresholds obtained at 0 dB HL 10,000-12,500 Hz, bilaterally. Excellent SRS scores, bilaterally.

## 2024-04-05 NOTE — HISTORY OF PRESENT ILLNESS
[FreeTextEntry1] : 23-year-old female referred for baseline audiological evaluation prior to beginning treatment with Tepezza for Graves' disease. Patient reports no difficulties hearing. Very infrequent occasions of high-pitched tinnitus noted. Patient denies otalgia, aural fullness, and family history of hearing loss.  [FreeTextEntry8] : s/p first infusion. Pt thinks tinnitus has increased in frequency, with occasional popping left ear.  Pt reports baseline tinnitus. 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

## 2024-04-05 NOTE — PLAN
[FreeTextEntry2] : Audiological monitoring as per MD or sooner if change in hearing is suspected.  Consider ENT consusltation

## 2024-04-09 ENCOUNTER — APPOINTMENT (OUTPATIENT)
Dept: ENDOCRINOLOGY | Facility: CLINIC | Age: 24
End: 2024-04-09
Payer: COMMERCIAL

## 2024-04-09 VITALS — WEIGHT: 156 LBS | HEIGHT: 61 IN | BODY MASS INDEX: 29.45 KG/M2

## 2024-04-09 VITALS
TEMPERATURE: 97.6 F | RESPIRATION RATE: 17 BRPM | OXYGEN SATURATION: 99 % | SYSTOLIC BLOOD PRESSURE: 120 MMHG | DIASTOLIC BLOOD PRESSURE: 62 MMHG | HEART RATE: 61 BPM

## 2024-04-09 PROCEDURE — G2211 COMPLEX E/M VISIT ADD ON: CPT

## 2024-04-09 PROCEDURE — 99214 OFFICE O/P EST MOD 30 MIN: CPT

## 2024-04-09 NOTE — HISTORY OF PRESENT ILLNESS
[FreeTextEntry1] : Problems: 1. Hyperthyroidism due to Graves' disease 2. Thyroid goiter 3. Graves' orbitopathy  Hyperthyroidism due to Graves' disease/Thyroid goiter/Graves' orbitopathy/Thyroid goiter 1. Diagnosed in April 2023 with severe hyperthyroidism due to Graves' disease 2. Labs: April 2023 - TRAB 21.96 (</=2),  (<140), TPO antibodies 9 (<9), antithyroglobulin antibodies 11 (</=1), TSH < 0.015 (0.47 to 4.7), Free T4 - 5.64 (0.71 to 1.85), Free T3 > 22.8 (2.77 to 5.27) June 2023 - 06/06/2023 - Cr N, AST 37 (14 to 36), ALT 38 (<35), WBC N, Hb N, plt N 07/25/2023 - WBC N, Hb N, plt 288 N, Cr N, AST N, ALT N 07/31/2023 - TSH < 0.015 (low), Free T3 - 17.9 (2.77 to 5.27), Free T4 - 1.71 N 11/30/2023 - TSI 24.9 (0-0.55), TSH-R abs 33.7 (0-1.75) 3. Radiology: A. 02/16/2023 - US thyroid - thyroid gland normal in size, no nodules seen B. 09/11/2023 - US thyroid - Enlarged heterogeneous hyperemic thyroid gland without focal nodularity consistent with a goiter. Right Lobe: 6.8 cm x 2.7 cm x 2.8 cm. Left Lobe: 6.8 cm x 3.1 cm x 3.4 cm. 4. Father and sister have hypothyroidism, no family history of thyroid cancer, no personal history of radiation treatment 5. Has Graves' orbitopathy and follows up with Ophthalmology - patient used prednisone for this in the past AND IS NOW ON TEPEZZA, no Graves' dermopathy 6. Meds: A. Patient is on oral hormonal contraceptive pills B. Patient used methimazole in the past - had puritis and vomiting with methimazole 10 mg po bid - this was discontinued and reintroduced at a lower dose but vomiting recurred.  C. Patient was then switched to PTU - she started PTU 50 mg po four times per day in June 2023 - labs indicate she was hyperthyroid on this dose but patient stated she missed her PTU for one week prior to labs being done.  D. Patient was on propranolol in past but she stopped using this as she says she felt like she no longer needed it.  E. Current treatment: 08/30/2023 to 09/14/2023 - patient used  mg po tid (dispensed as 50 mg pills) but was hypothyroid on this on 09/13/2023 (Free T4 was low at 0.2) so the dose was changed on 09/14/2023 09/14/2023 to 11/03/2023 - patient used  mg po bid (dispensed as 50 mg pills) but had overt hyperthyroidism on 10/25/2023 so the dose was changed on 11/03/2023 On 01/08/2024, TSH was suppressed and Free T4 and Total T3 were normal on  mg po am, 50 mg po lunch time and 100 mg po pm (dispensed as 50 mg pills) - patient started this dose on 11/03/2023. On 03/28/2024, TSH, Free T4 and Total T3 were normal on  mg po am, 50 mg po lunch time and 100 mg po pm (dispensed as 50 mg pills) - patient started this dose on 11/03/2023.

## 2024-04-09 NOTE — PHYSICAL EXAM
[de-identified] : General: No distress, well nourished Eyes: Normal Sclera, EOMI, PERRL ENT: Normal appearance of the nose, normal oropharynx Neck/Thyroid: No cervical lymphadenopathy, thyroid gland 20 g in size, no thyroid nodules, non-tender Respiratory: No use of accessory muscles of respiration, vesicular breath sounds heard bilaterally, no crepitations or ronchi Cardiovascular: S1 and S2 heard and normal, no S3 or S4, no murmurs, radial pulse normal bilaterally Abdomen: soft, non-tender, no masses, normal bowel sounds Musculoskeletal: No swelling or deformities of joints of hands, no pedal edema Neurological: Normal range of motion in the hands, Normal brachioradialis reflexes bilaterally Psychiatry: Patient converses normally, good judgement and insight Skin: No rashes in hands, no nodules palpated in hands

## 2024-04-09 NOTE — ASSESSMENT
[FreeTextEntry1] : This patient was diagnosed with severe hyperthyroidism due to Graves' disease in April 2023 (TSH-R abs were positive). She also has Graves' orbitopathy and follows up with Ophthalmology.  The patient is on PTU as she had pruritis and vomiting even with small doses of methimazole.  On 03/28/2024, the patient was euthyroid on  mg po am, 50 mg po lunch time and 100 mg po pm (dispensed as 50 mg pills) - patient started this dose on 11/03/2023. I will continue PTU until March 2025/Sept 2025 as she first became euthyroid on 03/28/2024.   Patient used propranolol in the past but she no longer requires this.  The patient wishes to avoid radioactive iodine therapy or surgery if she can - in addition, radioactive iodine therapy can worsen her Graves' orbitopathy - patient used prednisone for this in the past AND IS NOW ON TEPEZZA  She has a thyroid goiter - thyroid US in Sept 2023 revealed no thyroid nodules.  Patient advised that PTU may cause agranulocytosis and hepatic injury - the patient is to stop PTU and contact me urgently or go to an Urgent care facility if the patient develops fever, sore throat or jaundice.  The patient also advised to contact me urgently should she become pregnant as methimazole is teratogenic.  Plan: 1. Continue PTU to 100 mg po am, 50 mg po lunch time and 100 mg po pm (dispensed as 50 mg pills) 2. Labs to be done in 3 months - see below- patient is concerned about her Grave's orbitopathy worsening so I will also monitor TSI and TSH-R antibodies - her ophthalmologist also wishes that this be monitored. 3. Follow up in 3 months to review results - telehealth visit ok.

## 2024-04-12 ENCOUNTER — APPOINTMENT (OUTPATIENT)
Dept: OPHTHALMOLOGY | Facility: CLINIC | Age: 24
End: 2024-04-12
Payer: COMMERCIAL

## 2024-04-12 ENCOUNTER — NON-APPOINTMENT (OUTPATIENT)
Age: 24
End: 2024-04-12

## 2024-04-12 PROCEDURE — 99214 OFFICE O/P EST MOD 30 MIN: CPT

## 2024-04-12 PROCEDURE — 92285 EXTERNAL OCULAR PHOTOGRAPHY: CPT

## 2024-04-23 ENCOUNTER — APPOINTMENT (OUTPATIENT)
Dept: RHEUMATOLOGY | Facility: CLINIC | Age: 24
End: 2024-04-23
Payer: COMMERCIAL

## 2024-04-23 VITALS
RESPIRATION RATE: 16 BRPM | DIASTOLIC BLOOD PRESSURE: 70 MMHG | HEART RATE: 94 BPM | OXYGEN SATURATION: 97 % | TEMPERATURE: 98 F | SYSTOLIC BLOOD PRESSURE: 120 MMHG

## 2024-04-23 VITALS
OXYGEN SATURATION: 97 % | SYSTOLIC BLOOD PRESSURE: 117 MMHG | RESPIRATION RATE: 16 BRPM | DIASTOLIC BLOOD PRESSURE: 77 MMHG | HEART RATE: 62 BPM

## 2024-04-23 PROCEDURE — 96413 CHEMO IV INFUSION 1 HR: CPT

## 2024-04-23 RX ORDER — TEPROTUMUMAB 500 MG/1
500 INJECTION, POWDER, LYOPHILIZED, FOR SOLUTION INTRAVENOUS
Qty: 0 | Refills: 0 | Status: COMPLETED
Start: 2024-03-26

## 2024-04-23 NOTE — HISTORY OF PRESENT ILLNESS
[Denies] : Denies [No] : No [Yes] : Yes [Informed consent documented in EHR.] : Informed consent documented in EHR. [24g] : 24g [Start Time: ___] : Medication Start Time: [unfilled] [End Time: ___] : Medication End Time: [unfilled] [Medication Name: ___] : Medication Name: [unfilled] [Total Amount Administered: ___] : Total Amount Administered: [unfilled] [IV discontinued. Intact. No signs or symptoms of IV complications noted. Time: ___] : IV discontinued. Intact. No signs or symptoms of IV complications noted. Time: [unfilled] [Patient  instructed to seek medical attention with signs and symptoms of adverse effects] : Patient  instructed to seek medical attention with signs and symptoms of adverse effects [Patient left unit in no acute distress] : Patient left unit in no acute distress [Medications administered as ordered and tolerated well.] : Medications administered as ordered and tolerated well. [Drug wasted: _____] : Drug wasted: [unfilled] [de-identified] : dose 3:8 [de-identified] : left arm median cubital vein  [de-identified] : Hearring test performed 4/2/2024 [de-identified] : Patient presents for scheduled Tepezza infusion dose 3:8, ambulatory in [facility]. Today, patient reports overall to be doing well and reports having less "popping' sensation to her left ear. Patient with exophthalmos and complaints to dryness to right eye. Patient otherwise denies any other s/s. Medication administered as prescribed, and infusion tolerated well.

## 2024-05-14 ENCOUNTER — APPOINTMENT (OUTPATIENT)
Dept: RHEUMATOLOGY | Facility: CLINIC | Age: 24
End: 2024-05-14
Payer: COMMERCIAL

## 2024-05-14 VITALS
TEMPERATURE: 98.4 F | HEART RATE: 58 BPM | RESPIRATION RATE: 16 BRPM | DIASTOLIC BLOOD PRESSURE: 76 MMHG | SYSTOLIC BLOOD PRESSURE: 131 MMHG | OXYGEN SATURATION: 97 %

## 2024-05-14 VITALS
RESPIRATION RATE: 16 BRPM | DIASTOLIC BLOOD PRESSURE: 73 MMHG | OXYGEN SATURATION: 98 % | HEART RATE: 71 BPM | SYSTOLIC BLOOD PRESSURE: 120 MMHG

## 2024-05-14 PROCEDURE — 96413 CHEMO IV INFUSION 1 HR: CPT

## 2024-05-14 RX ORDER — TEPROTUMUMAB 500 MG/1
500 INJECTION, POWDER, LYOPHILIZED, FOR SOLUTION INTRAVENOUS
Qty: 0 | Refills: 0 | Status: COMPLETED
Start: 2024-03-26

## 2024-05-14 NOTE — HISTORY OF PRESENT ILLNESS
[Denies] : Denies [No] : No [Yes] : Yes [Informed consent documented in EHR.] : Informed consent documented in EHR. [de-identified] : dose 4:8 [24g] : 24g [Start Time: ___] : Medication Start Time: [unfilled] [End Time: ___] : Medication End Time: [unfilled] [Medication Name: ___] : Medication Name: [unfilled] [Total Amount Administered: ___] : Total Amount Administered: [unfilled] [IV discontinued. Intact. No signs or symptoms of IV complications noted. Time: ___] : IV discontinued. Intact. No signs or symptoms of IV complications noted. Time: [unfilled] [Patient  instructed to seek medical attention with signs and symptoms of adverse effects] : Patient  instructed to seek medical attention with signs and symptoms of adverse effects [Patient left unit in no acute distress] : Patient left unit in no acute distress [Medications administered as ordered and tolerated well.] : Medications administered as ordered and tolerated well. [Drug wasted: _____] : Drug wasted: [unfilled] [de-identified] : left arm median cubital vein  [de-identified] : Hearring test performed 4/2/2024 [de-identified] : Patient presents for scheduled Tepezza infusion dose 4:8, ambulatory in [facility]. Today, patient reports overall to be doing well. Patient with exophthalmos and complaints to dryness to right eye. Patient otherwise denies any other symptoms or concerns.  Medication administered as prescribed, and infusion tolerated well.

## 2024-05-31 ENCOUNTER — NON-APPOINTMENT (OUTPATIENT)
Age: 24
End: 2024-05-31

## 2024-05-31 ENCOUNTER — APPOINTMENT (OUTPATIENT)
Dept: OPHTHALMOLOGY | Facility: CLINIC | Age: 24
End: 2024-05-31
Payer: COMMERCIAL

## 2024-05-31 PROCEDURE — 99213 OFFICE O/P EST LOW 20 MIN: CPT

## 2024-05-31 PROCEDURE — 92285 EXTERNAL OCULAR PHOTOGRAPHY: CPT

## 2024-06-04 ENCOUNTER — APPOINTMENT (OUTPATIENT)
Dept: RHEUMATOLOGY | Facility: CLINIC | Age: 24
End: 2024-06-04
Payer: COMMERCIAL

## 2024-06-04 VITALS
OXYGEN SATURATION: 97 % | SYSTOLIC BLOOD PRESSURE: 117 MMHG | RESPIRATION RATE: 16 BRPM | TEMPERATURE: 98.4 F | DIASTOLIC BLOOD PRESSURE: 67 MMHG | HEART RATE: 63 BPM

## 2024-06-04 VITALS
RESPIRATION RATE: 16 BRPM | SYSTOLIC BLOOD PRESSURE: 126 MMHG | DIASTOLIC BLOOD PRESSURE: 75 MMHG | HEART RATE: 56 BPM | OXYGEN SATURATION: 98 %

## 2024-06-04 PROCEDURE — 96413 CHEMO IV INFUSION 1 HR: CPT

## 2024-06-04 RX ORDER — TEPROTUMUMAB 500 MG/1
500 INJECTION, POWDER, LYOPHILIZED, FOR SOLUTION INTRAVENOUS
Qty: 0 | Refills: 0 | Status: COMPLETED
Start: 2024-03-26

## 2024-06-04 NOTE — HISTORY OF PRESENT ILLNESS
[Denies] : Denies [No] : No [Yes] : Yes [Informed consent documented in EHR.] : Informed consent documented in EHR. [24g] : 24g [Start Time: ___] : Medication Start Time: [unfilled] [End Time: ___] : Medication End Time: [unfilled] [Medication Name: ___] : Medication Name: [unfilled] [Total Amount Administered: ___] : Total Amount Administered: [unfilled] [IV discontinued. Intact. No signs or symptoms of IV complications noted. Time: ___] : IV discontinued. Intact. No signs or symptoms of IV complications noted. Time: [unfilled] [Patient  instructed to seek medical attention with signs and symptoms of adverse effects] : Patient  instructed to seek medical attention with signs and symptoms of adverse effects [Patient left unit in no acute distress] : Patient left unit in no acute distress [Medications administered as ordered and tolerated well.] : Medications administered as ordered and tolerated well. [Drug wasted: _____] : Drug wasted: [unfilled] [de-identified] : dose 5:8 [de-identified] : left arm median cubital vein  [de-identified] : Hearring test performed 4/2/2024 [de-identified] : Patient presents for scheduled Tepezza infusion dose 5:8, ambulatory in [facility]. Today, patient reports overall to be doing well and today denies having any symptoms or concerns.  Medication administered as prescribed, and infusion tolerated well.

## 2024-06-05 ENCOUNTER — APPOINTMENT (OUTPATIENT)
Dept: SPEECH THERAPY | Facility: CLINIC | Age: 24
End: 2024-06-05

## 2024-06-05 PROCEDURE — 92567 TYMPANOMETRY: CPT

## 2024-06-05 PROCEDURE — 92557 COMPREHENSIVE HEARING TEST: CPT

## 2024-06-05 NOTE — HISTORY OF PRESENT ILLNESS
[FreeTextEntry1] : 23-year-old female referred for repeat audiological evaluation prior to beginning treatment with Tepezza for Graves' disease. Patient reports no difficulties hearing. Very infrequent occasions of high-pitched tinnitus noted. Patient denies otalgia, aural fullness, and family history of hearing loss.  [FreeTextEntry8] : s/p fifth infusion. Patient denies any change in hearing

## 2024-06-05 NOTE — PROCEDURE
[Normal Cochlear] : consistent with normal cochlear outer hair cell function  [OAE Present (Left)] : otoacoustic emissions present left ear [OAE Present (Right)] : otoacoustic emissions present right ear [226 Hz] : 226 Hz [Normal Eardrum Mobility] : consistent with normal eardrum mobility [Type A Tympanogram] : Type A Normal [] : Audiogram: [] : Complete Audiological Evaluation [Good] : good [Headphones] : headphones [FreeTextEntry2] : Present TEOAEs 1.5kHz-4kHz, right ear, and 1kHZ-4kHz, left ear, indicative of normal cochlear hair cell function at the frequencies present.  [de-identified] : Hearing within normal limits 250Hz-8kHz, bilaterally. Responses obtained at 0dBHL at 10kHz and 12.5kHz, bilaterally.

## 2024-06-20 ENCOUNTER — APPOINTMENT (OUTPATIENT)
Dept: ENDOCRINOLOGY | Facility: CLINIC | Age: 24
End: 2024-06-20
Payer: COMMERCIAL

## 2024-06-20 VITALS
HEART RATE: 60 BPM | SYSTOLIC BLOOD PRESSURE: 130 MMHG | WEIGHT: 156 LBS | DIASTOLIC BLOOD PRESSURE: 80 MMHG | OXYGEN SATURATION: 98 % | BODY MASS INDEX: 29.45 KG/M2 | HEIGHT: 61 IN

## 2024-06-20 DIAGNOSIS — E05.90 THYROTOXICOSIS, UNSPECIFIED W/OUT THYROTOXIC CRISIS OR STORM: ICD-10-CM

## 2024-06-20 DIAGNOSIS — E05.00 THYROTOXICOSIS WITH DIFFUSE GOITER W/OUT THYROTOXIC CRISIS OR STORM: ICD-10-CM

## 2024-06-20 DIAGNOSIS — H57.89 OTHER SPECIFIED DISORDERS OF EYE AND ADNEXA: ICD-10-CM

## 2024-06-20 DIAGNOSIS — E07.9 OTHER SPECIFIED DISORDERS OF EYE AND ADNEXA: ICD-10-CM

## 2024-06-20 PROCEDURE — 99215 OFFICE O/P EST HI 40 MIN: CPT

## 2024-06-20 PROCEDURE — G2211 COMPLEX E/M VISIT ADD ON: CPT

## 2024-06-20 NOTE — HISTORY OF PRESENT ILLNESS
[FreeTextEntry1] : CHIEF COMPLAINT: Hyperthyroidism secondary to Graves' disease REFERRED BY: Former endocrinologist Dr. Hudson.    HISTORY OF PRESENTING ILLNESS: The patient is a 23-year-old female being seen in the office today for evaluation of hyperthyroidism secondary to Graves' disease.  She was initially noted to have a neck lump in 4/2023, which prompted a thyroid ultrasound which showed a goiter, followed by TFTs which showed overt hyperthyroidism secondary to Graves' disease.  Her initial symptoms were anxiety, palpitations with exertion, mild tremors, diarrhea, neck swelling.  No hair or skin changes, no heat or cold intolerance, no lower extremity swelling. She was initially started on methimazole, which resulted in itching, redness and allergy was suspected. She started PTU around 8/2023, and has been tolerating it since then. She was initially on propranolol for symptoms, not taking anymore.  Current regimen:  mg in the morning, 50 mg in the afternoon and 100 mg in the evening Symptoms have improved significantly after starting ATD.  Reports that her goiter has decreased in size after initial treatment.  She has a visible neck swelling, but no dysphagia, no dyspnea, no change in voice.  She has mild tremors, no palpitations.  Thyroid eye disease: She has symptoms of proptosis and eye swelling, R>L.  Initially had some blurry vision, which has improved now.  Eye symptoms started in 9/2023.  She follows with Dr. Edelmira Medina.  She initially tried management with steroids, prednisone for 1 to 2 months but this resulted in a lot of weight gain and irritability so it was stopped.  She then started Tepezza, has received 5 out of 8 infusions so far and has been receiving it every 3 weeks, with good response. Weight gain on steroids around 26 pounds, original weight was 130 pounds, current weight is 156 pounds.   Family history: Father and sister have hypothyroidism. No personal history of radiation exposure in the head and neck area. No known history of thyroid nodules.  Thyroid US 9/2023 with enlarged, hypervascular gland without nodules.   Reproductive history: She has never been pregnant.  No current plans for pregnancy.  She might want a pregnancy in the next 5 years or so.  Social history: She works in marketing.

## 2024-06-20 NOTE — PHYSICAL EXAM
[TextEntry] : PHYSICAL EXAMINATION: Vital signs from today's encounter reviewed.  GENERAL: No acute distress, clinically eukinetic, normal appearance HEAD: Normocephalic, atraumatic EYES: conjunctivae are pink and moist, no icterus, + proptosis R>L NECK: Diffusely enlarged thyroid, non-tender, no adenopathy CARDIOVASCULAR: well-perfused extremities, no peripheral edema RESPIRATORY: normal chest expansion with good pulmonary effort, no acute respiratory distress MUSCULOSKELETAL: no swelling, normal range of motion, normal gait SKIN: no pallor, no icterus, no rash  NEUROLOGIC: alert and oriented, no evident focal deficits, mild tremors  PSYCHIATRIC: mood and affect are normal

## 2024-06-20 NOTE — ASSESSMENT
[FreeTextEntry1] : 1. Hyperthyroidism secondary to Graves' disease 2. Thyromegaly  Diagnosed around 4/2023.  Intolerant to methimazole.  Started PTU around 8/2023. -Continue  mg in the morning, 50 mg in the afternoon and 100 mg in the evening -Check TFTs, CBC, CMP, TSI and TRAb -Repeat TFTs 6 weeks after any dose change -We discussed risks and side effects of taking PTU, including but not limited to rash, agranulocytosis, liver dysfunction/failure. Advised that any fever/sore throat, nausea/vomiting/abdominal/pain/jaundice should prompt holding the medication and getting a lab draw for CBC and LFTs.  - Beta blocker: Heart rate well-controlled without beta-blocker at this time. - We discussed options for management of Graves' disease, such as continuing PTU versus total thyroidectomy.  We discussed that we would avoid HICKMAN for her given active eye disease.  We discussed pros and cons of continuing PTU versus total thyroidectomy.  The benefit of total thyroidectomy would be removal of large goiter, decrease in antibodies resulting in improvement of eye disease, not requiring PTU for long durations and during future pregnancies, less frequent lab monitoring with levothyroxine rather than with PTU.  The benefit of PTU would be reduction in antibodies and improvement of eye disease, possibility of remission, and not requiring surgical intervention.  She will think about these options and let us know if she needs a surgical referral.  3. RANJAN Initially managed with steroids, which resulted in 26 pound weight gain. Currently on Tepezza, 5/8 infusions, gets it every 3 weeks and is having a good response. Follows with Dr. Edelmira Medina. Counseled on not smoking.  She is a never smoker.  RTC in 3 months with KAROLYN David, 6 months with me.  Shon Collazo MD [facility] [place]  [address], [address] Ph: [phone] Fax: [phone]

## 2024-06-21 ENCOUNTER — RX RENEWAL (OUTPATIENT)
Age: 24
End: 2024-06-21

## 2024-06-21 RX ORDER — PROPYLTHIOURACIL 50 MG/1
50 TABLET ORAL
Qty: 450 | Refills: 0 | Status: ACTIVE | COMMUNITY
Start: 2023-08-30 | End: 1900-01-01

## 2024-06-25 ENCOUNTER — APPOINTMENT (OUTPATIENT)
Dept: RHEUMATOLOGY | Facility: CLINIC | Age: 24
End: 2024-06-25
Payer: COMMERCIAL

## 2024-06-25 VITALS
RESPIRATION RATE: 16 BRPM | SYSTOLIC BLOOD PRESSURE: 111 MMHG | DIASTOLIC BLOOD PRESSURE: 68 MMHG | OXYGEN SATURATION: 99 % | HEART RATE: 60 BPM

## 2024-06-25 VITALS
DIASTOLIC BLOOD PRESSURE: 75 MMHG | TEMPERATURE: 97.9 F | RESPIRATION RATE: 16 BRPM | SYSTOLIC BLOOD PRESSURE: 117 MMHG | OXYGEN SATURATION: 99 % | HEART RATE: 67 BPM

## 2024-06-25 PROCEDURE — 96365 THER/PROPH/DIAG IV INF INIT: CPT

## 2024-06-25 PROCEDURE — 36415 COLL VENOUS BLD VENIPUNCTURE: CPT

## 2024-06-25 RX ORDER — TEPROTUMUMAB 500 MG/1
500 INJECTION, POWDER, LYOPHILIZED, FOR SOLUTION INTRAVENOUS
Qty: 0 | Refills: 0 | Status: COMPLETED
Start: 2024-03-26

## 2024-06-26 LAB
ALBUMIN SERPL ELPH-MCNC: 4.6 G/DL
ALP BLD-CCNC: 49 U/L
ALT SERPL-CCNC: 24 U/L
ANION GAP SERPL CALC-SCNC: 19 MMOL/L
AST SERPL-CCNC: 25 U/L
BASOPHILS # BLD AUTO: 0.02 K/UL
BASOPHILS NFR BLD AUTO: 0.3 %
BILIRUB SERPL-MCNC: 0.3 MG/DL
BUN SERPL-MCNC: 19 MG/DL
CALCIUM SERPL-MCNC: 9.7 MG/DL
CHLORIDE SERPL-SCNC: 100 MMOL/L
CO2 SERPL-SCNC: 20 MMOL/L
CREAT SERPL-MCNC: 0.97 MG/DL
EGFR: 84 ML/MIN/1.73M2
EOSINOPHIL # BLD AUTO: 0.01 K/UL
EOSINOPHIL NFR BLD AUTO: 0.2 %
GLUCOSE SERPL-MCNC: 56 MG/DL
HCT VFR BLD CALC: 39.9 %
HGB BLD-MCNC: 13.7 G/DL
IMM GRANULOCYTES NFR BLD AUTO: 0.2 %
LYMPHOCYTES # BLD AUTO: 2 K/UL
LYMPHOCYTES NFR BLD AUTO: 32.1 %
MAN DIFF?: NORMAL
MCHC RBC-ENTMCNC: 31.2 PG
MCHC RBC-ENTMCNC: 34.3 GM/DL
MCV RBC AUTO: 90.9 FL
MONOCYTES # BLD AUTO: 0.48 K/UL
MONOCYTES NFR BLD AUTO: 7.7 %
NEUTROPHILS # BLD AUTO: 3.72 K/UL
NEUTROPHILS NFR BLD AUTO: 59.5 %
PLATELET # BLD AUTO: 229 K/UL
POTASSIUM SERPL-SCNC: 3.8 MMOL/L
PROT SERPL-MCNC: 7.1 G/DL
RBC # BLD: 4.39 M/UL
RBC # FLD: 12.9 %
SODIUM SERPL-SCNC: 139 MMOL/L
T3 SERPL-MCNC: 130 NG/DL
T4 FREE SERPL-MCNC: 1.3 NG/DL
TSH RECEPTOR AB: 2.26 IU/L
TSH SERPL-ACNC: 1.07 UIU/ML
WBC # FLD AUTO: 6.24 K/UL

## 2024-06-29 LAB — TSI ACT/NOR SER: 0.94 IU/L

## 2024-07-09 ENCOUNTER — APPOINTMENT (OUTPATIENT)
Dept: ENDOCRINOLOGY | Facility: CLINIC | Age: 24
End: 2024-07-09

## 2024-07-16 ENCOUNTER — APPOINTMENT (OUTPATIENT)
Dept: RHEUMATOLOGY | Facility: CLINIC | Age: 24
End: 2024-07-16
Payer: COMMERCIAL

## 2024-07-16 ENCOUNTER — APPOINTMENT (OUTPATIENT)
Dept: OBGYN | Facility: CLINIC | Age: 24
End: 2024-07-16

## 2024-07-16 VITALS
DIASTOLIC BLOOD PRESSURE: 77 MMHG | OXYGEN SATURATION: 100 % | RESPIRATION RATE: 16 BRPM | HEART RATE: 55 BPM | SYSTOLIC BLOOD PRESSURE: 117 MMHG

## 2024-07-16 VITALS
OXYGEN SATURATION: 97 % | TEMPERATURE: 98.3 F | RESPIRATION RATE: 16 BRPM | SYSTOLIC BLOOD PRESSURE: 120 MMHG | HEART RATE: 66 BPM | DIASTOLIC BLOOD PRESSURE: 73 MMHG

## 2024-07-16 VITALS
HEIGHT: 61 IN | BODY MASS INDEX: 28.7 KG/M2 | WEIGHT: 152 LBS | DIASTOLIC BLOOD PRESSURE: 94 MMHG | SYSTOLIC BLOOD PRESSURE: 143 MMHG

## 2024-07-16 DIAGNOSIS — Z82.49 FAMILY HISTORY OF ISCHEMIC HEART DISEASE AND OTHER DISEASES OF THE CIRCULATORY SYSTEM: ICD-10-CM

## 2024-07-16 DIAGNOSIS — Z11.3 ENCOUNTER FOR SCREENING FOR INFECTIONS WITH A PREDOMINANTLY SEXUAL MODE OF TRANSMISSION: ICD-10-CM

## 2024-07-16 PROCEDURE — 99385 PREV VISIT NEW AGE 18-39: CPT

## 2024-07-16 PROCEDURE — 96413 CHEMO IV INFUSION 1 HR: CPT

## 2024-07-16 PROCEDURE — 99459 PELVIC EXAMINATION: CPT

## 2024-07-16 PROCEDURE — G0444 DEPRESSION SCREEN ANNUAL: CPT | Mod: 59

## 2024-07-16 RX ORDER — TEPROTUMUMAB 500 MG/1
500 INJECTION, POWDER, LYOPHILIZED, FOR SOLUTION INTRAVENOUS
Qty: 0 | Refills: 0 | Status: COMPLETED
Start: 2024-03-26

## 2024-07-16 RX ORDER — LEVONORGESTREL AND ETHINYL ESTRADIOL 0.1-0.02MG
0.1-2 KIT ORAL
Qty: 4 | Refills: 3 | Status: ACTIVE | COMMUNITY
Start: 2024-07-16 | End: 1900-01-01

## 2024-07-17 LAB
C TRACH RRNA SPEC QL NAA+PROBE: NOT DETECTED
HBV SURFACE AG SER QL: NONREACTIVE
HCV AB SER QL: NONREACTIVE
HCV S/CO RATIO: 0.26 S/CO
HIV1+2 AB SPEC QL IA.RAPID: NONREACTIVE
N GONORRHOEA RRNA SPEC QL NAA+PROBE: NOT DETECTED
SOURCE AMPLIFICATION: NORMAL
T PALLIDUM AB SER QL IA: NEGATIVE

## 2024-07-19 ENCOUNTER — APPOINTMENT (OUTPATIENT)
Dept: OBGYN | Facility: CLINIC | Age: 24
End: 2024-07-19

## 2024-07-22 LAB — CYTOLOGY CVX/VAG DOC THIN PREP: NORMAL

## 2024-08-06 ENCOUNTER — APPOINTMENT (OUTPATIENT)
Dept: RHEUMATOLOGY | Facility: CLINIC | Age: 24
End: 2024-08-06

## 2024-08-06 PROCEDURE — 96413 CHEMO IV INFUSION 1 HR: CPT

## 2024-08-06 PROCEDURE — 36415 COLL VENOUS BLD VENIPUNCTURE: CPT

## 2024-08-06 NOTE — HISTORY OF PRESENT ILLNESS
[Denies] : Denies [No] : No [Yes] : Yes [Informed consent documented in EHR.] : Informed consent documented in EHR. [Left upper extremity] : Left upper extremity [24g] : 24g [Start Time: ___] : Medication Start Time: [unfilled] [End Time: ___] : Medication End Time: [unfilled] [Medication Name: ___] : Medication Name: [unfilled] [Total Amount Administered: ___] : Total Amount Administered: [unfilled] [IV discontinued. Intact. No signs or symptoms of IV complications noted. Time: ___] : IV discontinued. Intact. No signs or symptoms of IV complications noted. Time: [unfilled] [Patient  instructed to seek medical attention with signs and symptoms of adverse effects] : Patient  instructed to seek medical attention with signs and symptoms of adverse effects [Patient left unit in no acute distress] : Patient left unit in no acute distress [Medications administered as ordered and tolerated well.] : Medications administered as ordered and tolerated well. [Drug wasted: _____] : Drug wasted: [unfilled] [Blood drawn at time of visit] : Blood drawn at time of visit [de-identified] : dose 8:8 [de-identified] : left arm median cubital vein  [de-identified] : Labs drawn as prescribed  [de-identified] : Patient presents for scheduled Tepezza infusion dose 8:8, ambulatory in [facility]. Today, patient reports overall to be doing well and denies having any symptoms or concerns. Patient admits that her eyes are less "swollen" and overall, she's feeling better. Medication administered as prescribed, and infusion tolerated well.

## 2024-08-23 ENCOUNTER — NON-APPOINTMENT (OUTPATIENT)
Age: 24
End: 2024-08-23

## 2024-08-23 ENCOUNTER — APPOINTMENT (OUTPATIENT)
Dept: OPHTHALMOLOGY | Facility: CLINIC | Age: 24
End: 2024-08-23
Payer: COMMERCIAL

## 2024-08-23 PROCEDURE — 99214 OFFICE O/P EST MOD 30 MIN: CPT

## 2024-08-23 PROCEDURE — 92285 EXTERNAL OCULAR PHOTOGRAPHY: CPT

## 2024-09-27 ENCOUNTER — APPOINTMENT (OUTPATIENT)
Dept: ENDOCRINOLOGY | Facility: CLINIC | Age: 24
End: 2024-09-27
Payer: COMMERCIAL

## 2024-09-27 VITALS — DIASTOLIC BLOOD PRESSURE: 78 MMHG | SYSTOLIC BLOOD PRESSURE: 112 MMHG

## 2024-09-27 VITALS — WEIGHT: 147 LBS | HEART RATE: 88 BPM | BODY MASS INDEX: 27.75 KG/M2 | HEIGHT: 61 IN | OXYGEN SATURATION: 98 %

## 2024-09-27 DIAGNOSIS — H57.89 OTHER SPECIFIED DISORDERS OF EYE AND ADNEXA: ICD-10-CM

## 2024-09-27 DIAGNOSIS — E05.90 THYROTOXICOSIS, UNSPECIFIED W/OUT THYROTOXIC CRISIS OR STORM: ICD-10-CM

## 2024-09-27 DIAGNOSIS — E07.9 OTHER SPECIFIED DISORDERS OF EYE AND ADNEXA: ICD-10-CM

## 2024-09-27 DIAGNOSIS — E05.00 THYROTOXICOSIS WITH DIFFUSE GOITER W/OUT THYROTOXIC CRISIS OR STORM: ICD-10-CM

## 2024-09-27 PROCEDURE — G2211 COMPLEX E/M VISIT ADD ON: CPT

## 2024-09-27 PROCEDURE — 99214 OFFICE O/P EST MOD 30 MIN: CPT

## 2024-09-30 LAB
ALBUMIN SERPL ELPH-MCNC: 4.7 G/DL
ALP BLD-CCNC: 63 U/L
ALT SERPL-CCNC: 36 U/L
ANION GAP SERPL CALC-SCNC: 16 MMOL/L
AST SERPL-CCNC: 26 U/L
BASOPHILS # BLD AUTO: 0.03 K/UL
BASOPHILS NFR BLD AUTO: 0.5 %
BILIRUB SERPL-MCNC: 0.2 MG/DL
BUN SERPL-MCNC: 12 MG/DL
CALCIUM SERPL-MCNC: 9.7 MG/DL
CHLORIDE SERPL-SCNC: 99 MMOL/L
CO2 SERPL-SCNC: 24 MMOL/L
CREAT SERPL-MCNC: 1.08 MG/DL
EGFR: 74 ML/MIN/1.73M2
EOSINOPHIL # BLD AUTO: 0.01 K/UL
EOSINOPHIL NFR BLD AUTO: 0.2 %
GLUCOSE SERPL-MCNC: 79 MG/DL
HCT VFR BLD CALC: 44.9 %
HGB BLD-MCNC: 15.6 G/DL
IMM GRANULOCYTES NFR BLD AUTO: 0.2 %
LYMPHOCYTES # BLD AUTO: 1.71 K/UL
LYMPHOCYTES NFR BLD AUTO: 27.9 %
MAN DIFF?: NORMAL
MCHC RBC-ENTMCNC: 31 PG
MCHC RBC-ENTMCNC: 34.7 GM/DL
MCV RBC AUTO: 89.1 FL
MONOCYTES # BLD AUTO: 0.62 K/UL
MONOCYTES NFR BLD AUTO: 10.1 %
NEUTROPHILS # BLD AUTO: 3.74 K/UL
NEUTROPHILS NFR BLD AUTO: 61.1 %
PLATELET # BLD AUTO: 253 K/UL
POTASSIUM SERPL-SCNC: 4.5 MMOL/L
PROT SERPL-MCNC: 7.8 G/DL
RBC # BLD: 5.04 M/UL
RBC # FLD: 12.4 %
SODIUM SERPL-SCNC: 139 MMOL/L
T3 SERPL-MCNC: 131 NG/DL
T4 FREE SERPL-MCNC: 1.3 NG/DL
TSH RECEPTOR AB: 2.01 IU/L
TSH SERPL-ACNC: 0.6 UIU/ML
TSI ACT/NOR SER: 0.69 IU/L
WBC # FLD AUTO: 6.12 K/UL

## 2024-09-30 NOTE — HISTORY OF PRESENT ILLNESS
[FreeTextEntry1] : CHIEF COMPLAINT: Hyperthyroidism secondary to Graves' disease REFERRED BY: Former endocrinologist Dr. Hudson.    HISTORY OF PRESENTING ILLNESS: The patient is a 23-year-old female being seen in the office today for evaluation of hyperthyroidism secondary to Graves' disease.  She was initially noted to have a neck lump in 4/2023, which prompted a thyroid ultrasound which showed a goiter, followed by TFTs which showed overt hyperthyroidism secondary to Graves' disease.  Her initial symptoms were anxiety, palpitations with exertion, mild tremors, diarrhea, neck swelling.  No hair or skin changes, no heat or cold intolerance, no lower extremity swelling. She was initially started on methimazole, which resulted in itching, redness and allergy was suspected. She started PTU around 8/2023, and has been tolerating it since then. She was initially on propranolol for symptoms, not taking anymore.  Current regimen:  mg BID (dose decreased 6/26/2024, no side effects) Symptoms have improved significantly after starting ATD.  Reports that her goiter has decreased in size after initial treatment.  She has a visible neck swelling, but no dysphagia, no dyspnea, no change in voice.  She has mild tremors, no palpitations.   Thyroid eye disease: She has symptoms of proptosis and eye swelling, R>L.  Initially had some blurry vision, which has improved now.  Eye symptoms started in 9/2023.  She follows with Dr. Edelmira Medina.  She initially tried management with steroids, prednisone for 1 to 2 months but this resulted in a lot of weight gain and irritability so it was stopped.  She then started Tepezza, has finished 8 infusions recently, with good response. Weight gain on steroids around 26 pounds, original weight was 130 pounds, then gained to 156 pounds. Reports weight is now coming back down, currently 147 lbs.   Family history: Father and sister have hypothyroidism. No personal history of radiation exposure in the head and neck area. No known history of thyroid nodules.  Thyroid US 9/2023 with enlarged, hypervascular gland without nodules.   Reproductive history: She has never been pregnant.  No current plans for pregnancy.  She might want a pregnancy in the next 5 years or so.  Social history: She works in marketing.

## 2024-09-30 NOTE — ASSESSMENT
[FreeTextEntry1] : 1. Hyperthyroidism secondary to Graves' disease 2. Thyromegaly  Diagnosed around 4/2023.  Intolerant to methimazole.  Started PTU around 8/2023. -Continue  mg BID (dose decreased 6/26/2024). -Check TFTs, CBC, CMP, TSI and TRAb -Repeat TFTs 6 weeks after any dose change -We discussed risks and side effects of taking PTU, including but not limited to rash, agranulocytosis, liver dysfunction/failure. Advised that any fever/sore throat, nausea/vomiting/abdominal/pain/jaundice should prompt holding the medication and getting a lab draw for CBC and LFTs.  - Beta blocker: Heart rate well-controlled without beta-blocker at this time. - We discussed options for management of Graves' disease, such as continuing PTU versus total thyroidectomy.  We discussed that we would avoid HICKMAN for her given active eye disease.  We discussed pros and cons of continuing PTU versus total thyroidectomy.  The benefit of total thyroidectomy would be removal of large goiter, decrease in antibodies resulting in improvement of eye disease, not requiring PTU for long durations and during future pregnancies, less frequent lab monitoring with levothyroxine rather than with PTU.  The benefit of PTU would be reduction in antibodies and improvement of eye disease, possibility of remission, and not requiring surgical intervention.  She will think about these options and let us know if she needs a surgical referral.  3. RANJAN Initially managed with steroids, which resulted in 26 pound weight gain. Weight is coming back down now. Recently finished Tepezza, 8/8 infusions, having a good response. Follows with Dr. Edelmira Medina. Counseled on not smoking.  She is a never smoker.  RTC in 3 months with Dr. Collazo.  Toyin Marmolejo NP ([place])

## 2024-11-22 ENCOUNTER — NON-APPOINTMENT (OUTPATIENT)
Age: 24
End: 2024-11-22

## 2024-11-22 ENCOUNTER — APPOINTMENT (OUTPATIENT)
Dept: OPHTHALMOLOGY | Facility: CLINIC | Age: 24
End: 2024-11-22
Payer: COMMERCIAL

## 2024-11-22 PROCEDURE — 92285 EXTERNAL OCULAR PHOTOGRAPHY: CPT

## 2024-11-22 PROCEDURE — 99213 OFFICE O/P EST LOW 20 MIN: CPT

## 2024-12-24 ENCOUNTER — APPOINTMENT (OUTPATIENT)
Dept: ENDOCRINOLOGY | Facility: CLINIC | Age: 24
End: 2024-12-24
Payer: COMMERCIAL

## 2024-12-24 DIAGNOSIS — E07.9 OTHER SPECIFIED DISORDERS OF EYE AND ADNEXA: ICD-10-CM

## 2024-12-24 DIAGNOSIS — H57.89 OTHER SPECIFIED DISORDERS OF EYE AND ADNEXA: ICD-10-CM

## 2024-12-24 DIAGNOSIS — E05.00 THYROTOXICOSIS WITH DIFFUSE GOITER W/OUT THYROTOXIC CRISIS OR STORM: ICD-10-CM

## 2024-12-24 DIAGNOSIS — E05.90 THYROTOXICOSIS, UNSPECIFIED W/OUT THYROTOXIC CRISIS OR STORM: ICD-10-CM

## 2024-12-24 PROCEDURE — 99214 OFFICE O/P EST MOD 30 MIN: CPT

## 2024-12-24 PROCEDURE — G2211 COMPLEX E/M VISIT ADD ON: CPT

## 2025-02-12 ENCOUNTER — APPOINTMENT (OUTPATIENT)
Dept: FAMILY MEDICINE | Facility: CLINIC | Age: 25
End: 2025-02-12

## 2025-03-03 ENCOUNTER — NON-APPOINTMENT (OUTPATIENT)
Age: 25
End: 2025-03-03

## 2025-03-04 ENCOUNTER — APPOINTMENT (OUTPATIENT)
Dept: OPHTHALMOLOGY | Facility: CLINIC | Age: 25
End: 2025-03-04
Payer: SELF-PAY

## 2025-03-04 ENCOUNTER — NON-APPOINTMENT (OUTPATIENT)
Age: 25
End: 2025-03-04

## 2025-03-04 PROCEDURE — 64612 DESTROY NERVE FACE MUSCLE: CPT | Mod: 50

## 2025-03-04 PROCEDURE — 92285 EXTERNAL OCULAR PHOTOGRAPHY: CPT | Mod: 50

## 2025-03-04 PROCEDURE — 99215 OFFICE O/P EST HI 40 MIN: CPT | Mod: 50

## 2025-03-11 ENCOUNTER — APPOINTMENT (OUTPATIENT)
Dept: OPHTHALMOLOGY | Facility: CLINIC | Age: 25
End: 2025-03-11

## 2025-03-28 ENCOUNTER — APPOINTMENT (OUTPATIENT)
Dept: ENDOCRINOLOGY | Facility: CLINIC | Age: 25
End: 2025-03-28
Payer: COMMERCIAL

## 2025-03-28 DIAGNOSIS — E05.90 THYROTOXICOSIS, UNSPECIFIED W/OUT THYROTOXIC CRISIS OR STORM: ICD-10-CM

## 2025-03-28 DIAGNOSIS — E05.00 THYROTOXICOSIS WITH DIFFUSE GOITER W/OUT THYROTOXIC CRISIS OR STORM: ICD-10-CM

## 2025-03-28 DIAGNOSIS — E07.9 OTHER SPECIFIED DISORDERS OF EYE AND ADNEXA: ICD-10-CM

## 2025-03-28 DIAGNOSIS — H57.89 OTHER SPECIFIED DISORDERS OF EYE AND ADNEXA: ICD-10-CM

## 2025-03-28 PROCEDURE — 99214 OFFICE O/P EST MOD 30 MIN: CPT | Mod: 95

## 2025-03-28 PROCEDURE — G2211 COMPLEX E/M VISIT ADD ON: CPT | Mod: 95

## 2025-04-30 ENCOUNTER — APPOINTMENT (OUTPATIENT)
Dept: OBGYN | Facility: CLINIC | Age: 25
End: 2025-04-30

## 2025-05-07 ENCOUNTER — NON-APPOINTMENT (OUTPATIENT)
Age: 25
End: 2025-05-07

## 2025-05-07 ENCOUNTER — APPOINTMENT (OUTPATIENT)
Dept: INTERNAL MEDICINE | Facility: CLINIC | Age: 25
End: 2025-05-07
Payer: COMMERCIAL

## 2025-05-07 VITALS — HEIGHT: 61 IN | WEIGHT: 150.31 LBS | BODY MASS INDEX: 28.38 KG/M2

## 2025-05-07 VITALS
RESPIRATION RATE: 14 BRPM | HEART RATE: 59 BPM | SYSTOLIC BLOOD PRESSURE: 110 MMHG | OXYGEN SATURATION: 99 % | DIASTOLIC BLOOD PRESSURE: 68 MMHG

## 2025-05-07 DIAGNOSIS — Z00.00 ENCOUNTER FOR GENERAL ADULT MEDICAL EXAMINATION W/OUT ABNORMAL FINDINGS: ICD-10-CM

## 2025-05-07 DIAGNOSIS — Z87.2 PERSONAL HISTORY OF DISEASES OF THE SKIN AND SUBCUTANEOUS TISSUE: ICD-10-CM

## 2025-05-07 DIAGNOSIS — Z83.49 FAMILY HISTORY OF OTHER ENDOCRINE, NUTRITIONAL AND METABOLIC DISEASES: ICD-10-CM

## 2025-05-07 DIAGNOSIS — E05.90 THYROTOXICOSIS, UNSPECIFIED W/OUT THYROTOXIC CRISIS OR STORM: ICD-10-CM

## 2025-05-07 PROCEDURE — 99385 PREV VISIT NEW AGE 18-39: CPT

## 2025-05-07 PROCEDURE — 93000 ELECTROCARDIOGRAM COMPLETE: CPT

## 2025-05-29 ENCOUNTER — APPOINTMENT (OUTPATIENT)
Dept: OPHTHALMOLOGY | Facility: CLINIC | Age: 25
End: 2025-05-29

## 2025-07-23 ENCOUNTER — NON-APPOINTMENT (OUTPATIENT)
Age: 25
End: 2025-07-23

## 2025-07-28 ENCOUNTER — APPOINTMENT (OUTPATIENT)
Dept: ENDOCRINOLOGY | Facility: CLINIC | Age: 25
End: 2025-07-28

## 2025-08-18 ENCOUNTER — LABORATORY RESULT (OUTPATIENT)
Age: 25
End: 2025-08-18

## 2025-08-19 ENCOUNTER — TRANSCRIPTION ENCOUNTER (OUTPATIENT)
Age: 25
End: 2025-08-19

## 2025-08-20 ENCOUNTER — APPOINTMENT (OUTPATIENT)
Dept: ENDOCRINOLOGY | Facility: CLINIC | Age: 25
End: 2025-08-20
Payer: COMMERCIAL

## 2025-08-20 VITALS
HEIGHT: 61 IN | BODY MASS INDEX: 30.78 KG/M2 | SYSTOLIC BLOOD PRESSURE: 116 MMHG | DIASTOLIC BLOOD PRESSURE: 76 MMHG | OXYGEN SATURATION: 99 % | WEIGHT: 163 LBS | HEART RATE: 98 BPM

## 2025-08-20 DIAGNOSIS — H57.89 OTHER SPECIFIED DISORDERS OF EYE AND ADNEXA: ICD-10-CM

## 2025-08-20 DIAGNOSIS — E05.90 THYROTOXICOSIS, UNSPECIFIED W/OUT THYROTOXIC CRISIS OR STORM: ICD-10-CM

## 2025-08-20 DIAGNOSIS — E07.9 OTHER SPECIFIED DISORDERS OF EYE AND ADNEXA: ICD-10-CM

## 2025-08-20 DIAGNOSIS — E05.00 THYROTOXICOSIS WITH DIFFUSE GOITER W/OUT THYROTOXIC CRISIS OR STORM: ICD-10-CM

## 2025-08-20 LAB
T3 SERPL-MCNC: 146 NG/DL
T4 FREE SERPL-MCNC: 1.2 NG/DL
TSH RECEPTOR AB: 1.29 IU/L
TSH SERPL-ACNC: 0.99 UIU/ML

## 2025-08-20 PROCEDURE — G2211 COMPLEX E/M VISIT ADD ON: CPT

## 2025-08-20 PROCEDURE — 99214 OFFICE O/P EST MOD 30 MIN: CPT

## 2025-08-21 LAB — TSI ACT/NOR SER: 0.38 IU/L

## 2025-08-26 ENCOUNTER — TRANSCRIPTION ENCOUNTER (OUTPATIENT)
Age: 25
End: 2025-08-26